# Patient Record
Sex: MALE | Race: WHITE | Employment: OTHER | URBAN - METROPOLITAN AREA
[De-identification: names, ages, dates, MRNs, and addresses within clinical notes are randomized per-mention and may not be internally consistent; named-entity substitution may affect disease eponyms.]

---

## 2017-01-16 ENCOUNTER — TELEPHONE (OUTPATIENT)
Dept: FAMILY MEDICINE CLINIC | Facility: CLINIC | Age: 82
End: 2017-01-16

## 2017-01-16 RX ORDER — FUROSEMIDE 20 MG/1
20 TABLET ORAL DAILY
Qty: 90 TABLET | Refills: 0 | Status: SHIPPED | OUTPATIENT
Start: 2017-01-16 | End: 2017-03-23

## 2017-01-16 NOTE — TELEPHONE ENCOUNTER
Prescriptions given by ER , needs refills, and will see Dr Nanci Urena next Monday 01/23/2017. Refill sent to his pharmacy and was recommended to keep his appointment.

## 2017-01-23 ENCOUNTER — OFFICE VISIT (OUTPATIENT)
Dept: FAMILY MEDICINE CLINIC | Facility: CLINIC | Age: 82
End: 2017-01-23

## 2017-01-23 VITALS — DIASTOLIC BLOOD PRESSURE: 60 MMHG | SYSTOLIC BLOOD PRESSURE: 110 MMHG | WEIGHT: 197 LBS

## 2017-01-23 DIAGNOSIS — R32 URINARY INCONTINENCE, UNSPECIFIED TYPE: ICD-10-CM

## 2017-01-23 DIAGNOSIS — J18.9 PNEUMONIA DUE TO INFECTIOUS ORGANISM, UNSPECIFIED LATERALITY, UNSPECIFIED PART OF LUNG: Primary | ICD-10-CM

## 2017-01-23 PROCEDURE — 99213 OFFICE O/P EST LOW 20 MIN: CPT

## 2017-01-23 RX ORDER — ATORVASTATIN CALCIUM 20 MG/1
20 TABLET, FILM COATED ORAL DAILY
COMMUNITY
End: 2017-03-23

## 2017-01-23 RX ORDER — SOLIFENACIN SUCCINATE 10 MG/1
10 TABLET, FILM COATED ORAL DAILY
Qty: 90 TABLET | Refills: 1 | Status: SHIPPED | OUTPATIENT
Start: 2017-01-23 | End: 2017-03-23

## 2017-01-23 RX ORDER — METOPROLOL SUCCINATE 25 MG/1
25 TABLET, EXTENDED RELEASE ORAL DAILY
COMMUNITY
End: 2017-02-03

## 2017-01-24 NOTE — PROGRESS NOTES
HPI:    Patient ID: Rhea Macedo is a 80year old male. Cough  This is a new problem. Episode onset: 2 weeks ago. The problem has been gradually improving. The problem occurs constantly. The cough is non-productive.  Associated symptoms include r well-developed and well-nourished. No distress. HENT:   Nose: Rhinorrhea present. Right sinus exhibits no maxillary sinus tenderness and no frontal sinus tenderness. Left sinus exhibits no maxillary sinus tenderness and no frontal sinus tenderness.    Sarah

## 2017-01-24 NOTE — PATIENT INSTRUCTIONS
¿Qué es la neumonía?       La neumonía es jose j infección grave de los pulmones generalmente causada por bacterias o virus.  Otras causas menos comunes incluyen:  · Hongos  · Productos químicos  · Gases  También puede contraer neumonía después de otras enfe · Dolor de pecho al respirar o al toser  · Dolor de Kari Hammond  · [place] y piel arcillosa  Date Last Reviewed: 8/4/2014  © 4431-5614 [facility] [address] [facility], [address] [place] [address]. Todos los derechos reservados.  Esta inform

## 2017-02-01 ENCOUNTER — TELEPHONE (OUTPATIENT)
Dept: FAMILY MEDICINE CLINIC | Facility: CLINIC | Age: 82
End: 2017-02-01

## 2017-02-03 NOTE — TELEPHONE ENCOUNTER
B/P medication got switched per Dr Paulette Echeverria orders. Message left on his voicemail. Nico Maria is to call me back to tell what's covered instead of Vesicare.

## 2017-02-08 ENCOUNTER — TELEPHONE (OUTPATIENT)
Dept: FAMILY MEDICINE CLINIC | Facility: CLINIC | Age: 82
End: 2017-02-08

## 2017-02-08 NOTE — TELEPHONE ENCOUNTER
Per his son he is on Venlaxafine 37.5mg and wanted to know if he can double it to take a total of 75 mg, per Dr Erik Rhodes is ok to do it.

## 2017-02-27 ENCOUNTER — TELEPHONE (OUTPATIENT)
Dept: FAMILY MEDICINE CLINIC | Facility: CLINIC | Age: 82
End: 2017-02-27

## 2017-02-27 NOTE — TELEPHONE ENCOUNTER
Son wanted to know if he is to be under albuterol solution and if so if he needed it from mail order company, i suggested him not to fill it at the moment because he is not needing it.   To call me when he restart using the machine to send RX to his local p

## 2017-03-01 ENCOUNTER — TELEPHONE (OUTPATIENT)
Dept: FAMILY MEDICINE CLINIC | Facility: CLINIC | Age: 82
End: 2017-03-01

## 2017-03-01 RX ORDER — VENLAFAXINE 75 MG/1
75 TABLET ORAL 2 TIMES DAILY
Qty: 180 TABLET | Refills: 0 | Status: SHIPPED | OUTPATIENT
Start: 2017-03-01 | End: 2017-03-23

## 2017-03-01 NOTE — TELEPHONE ENCOUNTER
Patient got RX while he was hospitalized last month and is doing much better with it, per Dr Devante Elizalde ok to send refill for 3 months because [place] is going [place] of the country.

## 2017-03-23 ENCOUNTER — TELEPHONE (OUTPATIENT)
Dept: FAMILY MEDICINE CLINIC | Facility: CLINIC | Age: 82
End: 2017-03-23

## 2017-03-23 DIAGNOSIS — R32 URINARY INCONTINENCE, UNSPECIFIED TYPE: Primary | ICD-10-CM

## 2017-03-23 RX ORDER — SOLIFENACIN SUCCINATE 10 MG/1
10 TABLET, FILM COATED ORAL DAILY
Qty: 90 TABLET | Refills: 1 | Status: SHIPPED | OUTPATIENT
Start: 2017-03-23 | End: 2017-09-14

## 2017-03-23 RX ORDER — FUROSEMIDE 20 MG/1
20 TABLET ORAL DAILY
Qty: 30 TABLET | Refills: 2 | Status: SHIPPED | OUTPATIENT
Start: 2017-03-23 | End: 2017-03-24

## 2017-03-23 RX ORDER — ATORVASTATIN CALCIUM 20 MG/1
20 TABLET, FILM COATED ORAL DAILY
Qty: 30 TABLET | Refills: 2 | Status: SHIPPED | OUTPATIENT
Start: 2017-03-23 | End: 2017-06-21

## 2017-03-23 RX ORDER — VENLAFAXINE 75 MG/1
75 TABLET ORAL 2 TIMES DAILY
Qty: 60 TABLET | Refills: 2 | Status: SHIPPED | OUTPATIENT
Start: 2017-03-23 | End: 2017-09-15

## 2017-03-24 ENCOUNTER — TELEPHONE (OUTPATIENT)
Dept: FAMILY MEDICINE CLINIC | Facility: CLINIC | Age: 82
End: 2017-03-24

## 2017-03-24 RX ORDER — OXYBUTYNIN CHLORIDE 5 MG/1
5 TABLET ORAL 3 TIMES DAILY
Qty: 90 TABLET | Refills: 0 | Status: SHIPPED | OUTPATIENT
Start: 2017-03-24 | End: 2017-09-15

## 2017-03-24 RX ORDER — FUROSEMIDE 20 MG/1
20 TABLET ORAL DAILY
Qty: 90 TABLET | Refills: 0 | Status: SHIPPED | OUTPATIENT
Start: 2017-03-24 | End: 2017-10-16

## 2017-07-19 ENCOUNTER — TELEPHONE (OUTPATIENT)
Dept: FAMILY MEDICINE CLINIC | Facility: CLINIC | Age: 82
End: 2017-07-19

## 2017-07-19 NOTE — TELEPHONE ENCOUNTER
Son called regarding a letter he received for his father. I explained the letter is for his yearly well exam. Son states he is currently in [facility] and is healthy but will call and schedule an appointment once he is back in town.

## 2017-09-07 ENCOUNTER — TELEPHONE (OUTPATIENT)
Dept: FAMILY MEDICINE CLINIC | Facility: CLINIC | Age: 82
End: 2017-09-07

## 2017-09-08 ENCOUNTER — HOSPITAL ENCOUNTER (OUTPATIENT)
Dept: OTHER | Age: 82
Discharge: HOME | End: 2017-09-08
Attending: INTERNAL MEDICINE | Admitting: INTERNAL MEDICINE

## 2017-09-08 LAB
A/G RATIO: 1 (ref 1.1–2.2)
ALBUMIN SERPL-MCNC: 3.5 G/DL (ref 3.4–5)
ALP BLD-CCNC: 74 U/L (ref 40–129)
ALT SERPL-CCNC: 16 U/L (ref 10–40)
ANION GAP SERPL CALCULATED.3IONS-SCNC: 11 MMOL/L (ref 3–16)
AST SERPL-CCNC: 24 U/L (ref 15–37)
BASOPHILS ABSOLUTE: 0 K/UL (ref 0–0.2)
BASOPHILS RELATIVE PERCENT: 0.7 %
BILIRUB SERPL-MCNC: 0.6 MG/DL (ref 0–1)
BUN BLDV-MCNC: 18 MG/DL (ref 7–20)
CALCIUM SERPL-MCNC: 8.8 MG/DL (ref 8.3–10.6)
CHLORIDE BLD-SCNC: 103 MMOL/L (ref 99–110)
CHOLESTEROL, TOTAL: 110 MG/DL (ref 0–199)
CO2: 26 MMOL/L (ref 21–32)
CREAT SERPL-MCNC: 0.9 MG/DL (ref 0.8–1.3)
EOSINOPHILS ABSOLUTE: 0.2 K/UL (ref 0–0.6)
EOSINOPHILS RELATIVE PERCENT: 3.9 %
GFR AFRICAN AMERICAN: >60
GFR NON-AFRICAN AMERICAN: >60
GLOBULIN: 3.6 G/DL
GLUCOSE BLD-MCNC: 97 MG/DL (ref 70–99)
HCT VFR BLD CALC: 43.7 % (ref 40.5–52.5)
HDLC SERPL-MCNC: 47 MG/DL (ref 40–60)
HEMOGLOBIN: 14 G/DL (ref 13.5–17.5)
LDL CHOLESTEROL CALCULATED: 50 MG/DL
LYMPHOCYTES ABSOLUTE: 1.8 K/UL (ref 1–5.1)
LYMPHOCYTES RELATIVE PERCENT: 29.4 %
MCH RBC QN AUTO: 27.7 PG (ref 26–34)
MCHC RBC AUTO-ENTMCNC: 32.1 G/DL (ref 31–36)
MCV RBC AUTO: 86.4 FL (ref 80–100)
MONOCYTES ABSOLUTE: 0.6 K/UL (ref 0–1.3)
MONOCYTES RELATIVE PERCENT: 10.6 %
NEUTROPHILS ABSOLUTE: 3.4 K/UL (ref 1.7–7.7)
NEUTROPHILS RELATIVE PERCENT: 55.4 %
PDW BLD-RTO: 13.7 % (ref 12.4–15.4)
PLATELET # BLD: 194 K/UL (ref 135–450)
PMV BLD AUTO: 8.6 FL (ref 5–10.5)
POTASSIUM SERPL-SCNC: 4.2 MMOL/L (ref 3.5–5.1)
RBC # BLD: 5.06 M/UL (ref 4.2–5.9)
SODIUM BLD-SCNC: 140 MMOL/L (ref 136–145)
TOTAL PROTEIN: 7.1 G/DL (ref 6.4–8.2)
TRIGL SERPL-MCNC: 67 MG/DL (ref 0–150)
VLDLC SERPL CALC-MCNC: 13 MG/DL
WBC # BLD: 6.1 K/UL (ref 4–11)

## 2017-09-14 ENCOUNTER — OFFICE VISIT (OUTPATIENT)
Dept: FAMILY MEDICINE CLINIC | Facility: CLINIC | Age: 82
End: 2017-09-14

## 2017-09-14 VITALS
OXYGEN SATURATION: 95 % | HEIGHT: 66.25 IN | DIASTOLIC BLOOD PRESSURE: 70 MMHG | WEIGHT: 191 LBS | HEART RATE: 76 BPM | BODY MASS INDEX: 30.7 KG/M2 | SYSTOLIC BLOOD PRESSURE: 110 MMHG

## 2017-09-14 DIAGNOSIS — R73.09 ELEVATED GLUCOSE LEVEL: ICD-10-CM

## 2017-09-14 DIAGNOSIS — Z95.0 STATUS POST BIVENTRICULAR CARDIAC PACEMAKER INSERTION: ICD-10-CM

## 2017-09-14 DIAGNOSIS — H91.93 BILATERAL HEARING LOSS, UNSPECIFIED HEARING LOSS TYPE: ICD-10-CM

## 2017-09-14 DIAGNOSIS — J06.9 VIRAL UPPER RESPIRATORY TRACT INFECTION: ICD-10-CM

## 2017-09-14 DIAGNOSIS — Z00.00 ENCOUNTER FOR MEDICARE ANNUAL WELLNESS EXAM: Primary | ICD-10-CM

## 2017-09-14 DIAGNOSIS — I10 ESSENTIAL HYPERTENSION: ICD-10-CM

## 2017-09-14 DIAGNOSIS — R32 URINARY INCONTINENCE, UNSPECIFIED TYPE: ICD-10-CM

## 2017-09-14 DIAGNOSIS — I48.0 PAROXYSMAL ATRIAL FIBRILLATION (HCC): ICD-10-CM

## 2017-09-14 DIAGNOSIS — H54.8 LEGALLY BLIND IN LEFT EYE, AS DEFINED IN USA: ICD-10-CM

## 2017-09-14 DIAGNOSIS — E78.00 HYPERCHOLESTEREMIA: ICD-10-CM

## 2017-09-14 DIAGNOSIS — Z13.31 DEPRESSION SCREENING: ICD-10-CM

## 2017-09-14 DIAGNOSIS — E66.09 NON MORBID OBESITY DUE TO EXCESS CALORIES: ICD-10-CM

## 2017-09-14 DIAGNOSIS — Z12.5 ENCOUNTER FOR PROSTATE CANCER SCREENING: ICD-10-CM

## 2017-09-14 LAB
ALBUMIN SERPL BCP-MCNC: 3.6 G/DL (ref 3.5–4.8)
ALBUMIN/GLOB SERPL: 1.1 {RATIO} (ref 1–2)
ALP SERPL-CCNC: 69 U/L (ref 32–100)
ALT SERPL-CCNC: 18 U/L (ref 17–63)
ANION GAP SERPL CALC-SCNC: 8 MMOL/L (ref 0–18)
APPEARANCE: CLEAR
AST SERPL-CCNC: 31 U/L (ref 15–41)
BILIRUB SERPL-MCNC: 0.7 MG/DL (ref 0.3–1.2)
BILIRUBIN: NEGATIVE
BUN SERPL-MCNC: 19 MG/DL (ref 8–20)
BUN/CREAT SERPL: 15.3 (ref 10–20)
CALCIUM SERPL-MCNC: 8.6 MG/DL (ref 8.5–10.5)
CHLORIDE SERPL-SCNC: 107 MMOL/L (ref 95–110)
CHOLEST SERPL-MCNC: 128 MG/DL (ref 110–200)
CO2 SERPL-SCNC: 25 MMOL/L (ref 22–32)
CREAT SERPL-MCNC: 1.24 MG/DL (ref 0.5–1.5)
ERYTHROCYTE [DISTWIDTH] IN BLOOD BY AUTOMATED COUNT: 14.1 % (ref 11–15)
GLOBULIN PLAS-MCNC: 3.2 G/DL (ref 2.5–3.7)
GLUCOSE (URINE DIPSTICK): NEGATIVE MG/DL
GLUCOSE SERPL-MCNC: 100 MG/DL (ref 70–99)
HCT VFR BLD AUTO: 44.8 % (ref 41–52)
HDLC SERPL-MCNC: 44 MG/DL
HGB BLD-MCNC: 14.2 G/DL (ref 13.5–17.5)
KETONES (URINE DIPSTICK): NEGATIVE MG/DL
LDLC SERPL CALC-MCNC: 61 MG/DL (ref 0–99)
LEUKOCYTES: NEGATIVE
MCH RBC QN AUTO: 27.5 PG (ref 27–32)
MCHC RBC AUTO-ENTMCNC: 31.6 G/DL (ref 32–37)
MCV RBC AUTO: 87 FL (ref 80–100)
MULTISTIX LOT#: NORMAL NUMERIC
NITRITE, URINE: NEGATIVE
NONHDLC SERPL-MCNC: 84 MG/DL
OCCULT BLOOD: NEGATIVE
OSMOLALITY UR CALC.SUM OF ELEC: 292 MOSM/KG (ref 275–295)
PH, URINE: 7 (ref 4.5–8)
PLATELET # BLD AUTO: 198 K/UL (ref 140–400)
PMV BLD AUTO: 9 FL (ref 7.4–10.3)
POTASSIUM SERPL-SCNC: 4.1 MMOL/L (ref 3.3–5.1)
PROT SERPL-MCNC: 6.8 G/DL (ref 5.9–8.4)
PROTEIN (URINE DIPSTICK): NEGATIVE MG/DL
PSA SERPL-MCNC: 0.4 NG/ML (ref 0–4)
RBC # BLD AUTO: 5.15 M/UL (ref 4.5–5.9)
SODIUM SERPL-SCNC: 140 MMOL/L (ref 136–144)
SPECIFIC GRAVITY: 1.01 (ref 1–1.03)
TRIGL SERPL-MCNC: 115 MG/DL (ref 1–149)
URINE-COLOR: YELLOW
UROBILINOGEN,SEMI-QN: 0.2 MG/DL (ref 0–1.9)
WBC # BLD AUTO: 9.3 K/UL (ref 4–11)

## 2017-09-14 PROCEDURE — 99213 OFFICE O/P EST LOW 20 MIN: CPT

## 2017-09-14 PROCEDURE — 81003 URINALYSIS AUTO W/O SCOPE: CPT

## 2017-09-14 PROCEDURE — 90653 IIV ADJUVANT VACCINE IM: CPT

## 2017-09-14 PROCEDURE — 90670 PCV13 VACCINE IM: CPT

## 2017-09-14 PROCEDURE — G0009 ADMIN PNEUMOCOCCAL VACCINE: HCPCS

## 2017-09-14 PROCEDURE — G0439 PPPS, SUBSEQ VISIT: HCPCS

## 2017-09-14 PROCEDURE — G0008 ADMIN INFLUENZA VIRUS VAC: HCPCS

## 2017-09-14 PROCEDURE — 36415 COLL VENOUS BLD VENIPUNCTURE: CPT

## 2017-09-14 PROCEDURE — G0444 DEPRESSION SCREEN ANNUAL: HCPCS

## 2017-09-14 RX ORDER — PROMETHAZINE HYDROCHLORIDE AND CODEINE PHOSPHATE 6.25; 1 MG/5ML; MG/5ML
2.5 SYRUP ORAL EVERY 6 HOURS PRN
Qty: 240 ML | Refills: 0 | Status: SHIPPED | OUTPATIENT
Start: 2017-09-14 | End: 2018-03-23 | Stop reason: ALTCHOICE

## 2017-09-14 RX ORDER — SOLIFENACIN SUCCINATE 10 MG/1
10 TABLET, FILM COATED ORAL DAILY
Qty: 90 TABLET | Refills: 1 | Status: SHIPPED | OUTPATIENT
Start: 2017-09-14 | End: 2018-03-01

## 2017-09-15 PROBLEM — E78.00 HYPERCHOLESTEREMIA: Status: ACTIVE | Noted: 2017-09-15

## 2017-09-15 PROBLEM — I48.0 PAROXYSMAL ATRIAL FIBRILLATION (HCC): Status: ACTIVE | Noted: 2017-09-15

## 2017-09-15 PROBLEM — Z00.00 ENCOUNTER FOR MEDICARE ANNUAL WELLNESS EXAM: Status: ACTIVE | Noted: 2017-09-15

## 2017-09-15 PROBLEM — J18.9 PNEUMONIA DUE TO INFECTIOUS ORGANISM: Status: RESOLVED | Noted: 2017-01-23 | Resolved: 2017-09-15

## 2017-09-15 PROBLEM — Z12.5 ENCOUNTER FOR PROSTATE CANCER SCREENING: Status: ACTIVE | Noted: 2017-09-15

## 2017-09-15 PROBLEM — E66.09 NON MORBID OBESITY DUE TO EXCESS CALORIES: Status: ACTIVE | Noted: 2017-09-15

## 2017-09-15 PROBLEM — H91.93 BILATERAL HEARING LOSS: Status: ACTIVE | Noted: 2017-09-15

## 2017-09-15 PROBLEM — Z95.0 STATUS POST BIVENTRICULAR CARDIAC PACEMAKER INSERTION: Status: ACTIVE | Noted: 2017-09-15

## 2017-09-15 PROBLEM — I10 ESSENTIAL HYPERTENSION: Status: ACTIVE | Noted: 2017-09-15

## 2017-09-15 PROBLEM — Z13.31 DEPRESSION SCREENING: Status: ACTIVE | Noted: 2017-09-15

## 2017-09-15 PROBLEM — J06.9 VIRAL UPPER RESPIRATORY TRACT INFECTION: Status: ACTIVE | Noted: 2017-09-15

## 2017-09-15 PROBLEM — H54.8 LEGALLY BLIND IN LEFT EYE, AS DEFINED IN USA: Status: ACTIVE | Noted: 2017-09-15

## 2017-09-15 PROBLEM — R73.09 ELEVATED GLUCOSE LEVEL: Status: ACTIVE | Noted: 2017-09-15

## 2017-09-15 NOTE — PROGRESS NOTES
HPI:   Sadie Mcdonald is a 80year old male who presents for a Medicare Subsequent Annual Wellness visit (Pt already had Initial Annual Wellness). Pt denies any complaints/concerns at this time.        Patient Care Team: Patient Care Team:  Paola Hughes includes Cardiac pacemaker placement (10/08/2012) and knee replacement surgery (Left, 04/04/2012). His family history includes Arthritis in his daughter. SOCIAL HISTORY:   He  reports that he has never smoked.  He has never used smokeless tobacco. He r normal, no sinus tenderness, + nasal congestion   Throat: Lips, mucosa, and tongue normal; teeth and gums normal   Neck: Supple, symmetrical, trachea midline, no adenopathy, thyroid: not enlarged, symmetric, no tenderness/mass/nodules, no carotid bruit or instructed. Essential hypertension  - Continue Furosemide and Metoprolol tartrate as previously instructed. Hypercholesteremia  - Continue Atorvastatin as previously instructed.     Elevated glucose level  - Pt counseled with regards to diet/exercise MD, 9/15/2017       General Health     In the past six months, have you lost more than 10 pounds without trying?: 1 - Yes    Has your appetite been poor?: No    How does the patient maintain a good energy level?: Daily Walks    How would you describe your weeks)?: Not at all    PHQ-2 SCORE: 0         Advance Directives     Do you have a healthcare power of ?: No    Do you have a living will?: No     Please go to \"Cognitive Assessment\" under Medicare Assessment section in Charting, test patient and Orders placed or performed in visit on 09/14/17  -PNEUMOCOCCAL VACC, 13 SHANDA IM         Pneumococcal 23 (Pneumovax) No orders found for this or any previous visit. Hepatitis B No orders found for this or any previous visit.      Tetanus No orders found

## 2017-09-15 NOTE — PATIENT INSTRUCTIONS
Jonathan Gomez's SCREENING SCHEDULE   Tests on this list are recommended by your physician but may not be covered, or covered at this frequency, by your insurer. Please check with your insurance carrier before scheduling to verify coverage.     PREV often if abnormal There are no preventive care reminders to display for this patient. Update Health Maintenance if applicable    Flex Sigmoidoscopy Screen  Covered every 5 years No results found for this or any previous visit. No flowsheet data found. or any previous visit. This may be covered with your prescription benefits, but Medicare does not cover unless Medically needed    Zoster (Not covered by Medicare Part B) No orders found for this or any previous visit.  This may be covered with your pharmac

## 2017-10-16 ENCOUNTER — TELEPHONE (OUTPATIENT)
Dept: FAMILY MEDICINE CLINIC | Facility: CLINIC | Age: 82
End: 2017-10-16

## 2017-10-16 RX ORDER — FUROSEMIDE 20 MG/1
20 TABLET ORAL DAILY
Qty: 90 TABLET | Refills: 0 | Status: SHIPPED | OUTPATIENT
Start: 2017-10-16 | End: 2018-03-01

## 2017-10-16 NOTE — TELEPHONE ENCOUNTER
Son is calling asking to get a refill on a medication for furosemide 20mg to a [facility] in [place], [place] phone number is [phone]

## 2017-10-16 NOTE — TELEPHONE ENCOUNTER
Son asked to get the Lasix filled at the [address] [place] [address], asked to please rush the refill as they are on their way to [address] [place] [address] 90-day supply only, which is enough to suffice until his appointment for follow up in December.

## 2017-12-27 RX ORDER — FUROSEMIDE 20 MG/1
TABLET ORAL
Qty: 90 TABLET | Refills: 0 | OUTPATIENT
Start: 2017-12-27

## 2018-03-01 ENCOUNTER — TELEPHONE (OUTPATIENT)
Dept: FAMILY MEDICINE CLINIC | Facility: CLINIC | Age: 83
End: 2018-03-01

## 2018-03-01 DIAGNOSIS — R32 URINARY INCONTINENCE, UNSPECIFIED TYPE: ICD-10-CM

## 2018-03-01 RX ORDER — FUROSEMIDE 20 MG/1
20 TABLET ORAL DAILY
Qty: 90 TABLET | Refills: 0 | Status: SHIPPED | OUTPATIENT
Start: 2018-03-01 | End: 2018-03-23

## 2018-03-01 RX ORDER — SOLIFENACIN SUCCINATE 10 MG/1
10 TABLET, FILM COATED ORAL DAILY
Qty: 90 TABLET | Refills: 0 | Status: SHIPPED | OUTPATIENT
Start: 2018-03-01 | End: 2018-03-23

## 2018-03-19 ENCOUNTER — TELEPHONE (OUTPATIENT)
Dept: FAMILY MEDICINE CLINIC | Facility: CLINIC | Age: 83
End: 2018-03-19

## 2018-03-19 DIAGNOSIS — H91.93 BILATERAL HEARING LOSS, UNSPECIFIED HEARING LOSS TYPE: Primary | ICD-10-CM

## 2018-03-20 NOTE — TELEPHONE ENCOUNTER
Patient is due for a hearing aid check up and goes to [facility] for it, they want him to get an order form Dr Mei Leroy to evaluate him.   Ok per Dr Mei Leroy to issue him the order/referral.

## 2018-03-23 ENCOUNTER — OFFICE VISIT (OUTPATIENT)
Dept: FAMILY MEDICINE CLINIC | Facility: CLINIC | Age: 83
End: 2018-03-23

## 2018-03-23 VITALS
DIASTOLIC BLOOD PRESSURE: 80 MMHG | WEIGHT: 178 LBS | BODY MASS INDEX: 28.27 KG/M2 | HEART RATE: 72 BPM | OXYGEN SATURATION: 96 % | HEIGHT: 66.5 IN | SYSTOLIC BLOOD PRESSURE: 100 MMHG

## 2018-03-23 DIAGNOSIS — E78.00 HYPERCHOLESTEREMIA: ICD-10-CM

## 2018-03-23 DIAGNOSIS — Z01.89 ENCOUNTER FOR ROUTINE LABORATORY TESTING: ICD-10-CM

## 2018-03-23 DIAGNOSIS — Z12.5 ENCOUNTER FOR PROSTATE CANCER SCREENING: ICD-10-CM

## 2018-03-23 DIAGNOSIS — I10 ESSENTIAL HYPERTENSION: Primary | ICD-10-CM

## 2018-03-23 DIAGNOSIS — R32 URINARY INCONTINENCE, UNSPECIFIED TYPE: ICD-10-CM

## 2018-03-23 DIAGNOSIS — I48.0 PAROXYSMAL ATRIAL FIBRILLATION (HCC): ICD-10-CM

## 2018-03-23 PROBLEM — J06.9 VIRAL UPPER RESPIRATORY TRACT INFECTION: Status: RESOLVED | Noted: 2017-09-15 | Resolved: 2018-03-23

## 2018-03-23 PROBLEM — Z00.00 ENCOUNTER FOR MEDICARE ANNUAL WELLNESS EXAM: Status: RESOLVED | Noted: 2017-09-15 | Resolved: 2018-03-23

## 2018-03-23 PROCEDURE — 99213 OFFICE O/P EST LOW 20 MIN: CPT

## 2018-03-23 RX ORDER — SOLIFENACIN SUCCINATE 10 MG/1
10 TABLET, FILM COATED ORAL
COMMUNITY
End: 2018-03-23

## 2018-03-23 RX ORDER — ATORVASTATIN CALCIUM 10 MG/1
10 TABLET, FILM COATED ORAL
COMMUNITY
Start: 2017-09-13 | End: 2018-03-23

## 2018-03-23 RX ORDER — SOLIFENACIN SUCCINATE 10 MG/1
10 TABLET, FILM COATED ORAL DAILY
Qty: 90 TABLET | Refills: 1 | Status: SHIPPED | OUTPATIENT
Start: 2018-03-23 | End: 2019-02-06

## 2018-03-23 RX ORDER — ATORVASTATIN CALCIUM 10 MG/1
10 TABLET, FILM COATED ORAL NIGHTLY
Qty: 90 TABLET | Refills: 1 | Status: SHIPPED | OUTPATIENT
Start: 2018-03-23 | End: 2019-03-18

## 2018-03-23 RX ORDER — FUROSEMIDE 20 MG/1
20 TABLET ORAL
COMMUNITY
End: 2018-03-23

## 2018-03-24 NOTE — PATIENT INSTRUCTIONS
Factores de riesgo para la presión arterial clifford  Los factores de riesgo lo hacen más susceptible a desarrollar jose j enfermedad o afección. ¿Sabe cuáles son marge factores de riesgo para la presión arterial clifford?  Aunque usted no puede hacer nada acerca de c

## 2018-03-24 NOTE — PROGRESS NOTES
HPI:    Patient ID: Marcos Maciel is a 80year old male. Hypertension   This is a chronic problem. Episode onset: few years ago. Progression since onset: stable. The problem is controlled. Associated symptoms include blurred vision.  Pertinent nega tablet (10 mg total) by mouth daily. Disp: 90 tablet Rfl: 1   metoprolol Tartrate 25 MG Oral Tab Take 1 tablet (25 mg total) by mouth daily.  Disp: 90 tablet Rfl: 1     Allergies:No Known Allergies   PHYSICAL EXAM:   Physical Exam   Constitutional: He is or Tab 180 tablet 1      Sig: Take 1 tablet (5 mg total) by mouth 2 (two) times daily. Solifenacin Succinate (VESICARE) 10 MG Oral Tab 90 tablet 1      Sig: Take 1 tablet (10 mg total) by mouth daily.       metoprolol Tartrate 25 MG Oral Tab 90 tablet 1

## 2018-04-05 ENCOUNTER — OFFICE VISIT (OUTPATIENT)
Dept: OTOLARYNGOLOGY | Facility: CLINIC | Age: 83
End: 2018-04-05

## 2018-04-05 ENCOUNTER — OFFICE VISIT (OUTPATIENT)
Dept: AUDIOLOGY | Facility: CLINIC | Age: 83
End: 2018-04-05

## 2018-04-05 VITALS
DIASTOLIC BLOOD PRESSURE: 70 MMHG | BODY MASS INDEX: 28.27 KG/M2 | TEMPERATURE: 98 F | WEIGHT: 178 LBS | HEIGHT: 66.5 IN | SYSTOLIC BLOOD PRESSURE: 117 MMHG

## 2018-04-05 DIAGNOSIS — H90.3 SENSORINEURAL HEARING LOSS, BILATERAL: Primary | ICD-10-CM

## 2018-04-05 DIAGNOSIS — H90.3 SENSORINEURAL HEARING LOSS (SNHL) OF BOTH EARS: Primary | ICD-10-CM

## 2018-04-05 PROCEDURE — 92557 COMPREHENSIVE HEARING TEST: CPT | Performed by: AUDIOLOGIST

## 2018-04-05 PROCEDURE — 99203 OFFICE O/P NEW LOW 30 MIN: CPT | Performed by: OTOLARYNGOLOGY

## 2018-04-05 PROCEDURE — G0463 HOSPITAL OUTPT CLINIC VISIT: HCPCS | Performed by: OTOLARYNGOLOGY

## 2018-04-05 PROCEDURE — 92567 TYMPANOMETRY: CPT | Performed by: AUDIOLOGIST

## 2018-04-05 NOTE — PROGRESS NOTES
Layne Self is a 80year old male. Patient presents with:  Hearing Loss: decreased hearing of both ears for a while      HISTORY OF PRESENT ILLNESS  He presents with a history of progressive hearing loss bilaterally.   He is here with his son who s Frequent skin infections, pigment change and rash. Hema/Lymph Negative Easy bleeding and easy bruising.            PHYSICAL EXAM    /70   Temp 97.8 °F (36.6 °C) (Tympanic)   Ht 5' 6.5\" (1.689 m)   Wt 178 lb (80.7 kg)   BMI 28.30 kg/m²        Consti 1  ASSESSMENT AND PLAN    1. Sensorineural hearing loss (SNHL) of both ears  Severe to profound sensorineural hearing loss with speech discrimination scores of 24% bilaterally.   I did discuss this with the patient and his son that this is the reason is hav

## 2018-04-09 NOTE — PROGRESS NOTES
AUDIOGRAM     [place] was referred for testing by Wing Castillo for decreased hearing in both ears   2/18/1926  [id number]      Otoscopic inspection: right ear no cerumen; left ear no cerumen.        Tests/Procedures  Patient was tested vi

## 2018-04-09 NOTE — PATIENT INSTRUCTIONS
Cómo pueden ayudarle los audífonos    Si tiene dificultades para oír, hay buenas noticias para usted: Los audífonos pueden ayudarle a oír lo que se estaba perdiendo. No todas las personas con pérdida de audición necesitan usar audífonos.  Todd si la pérdi © 3512-7981 [employer] [address]. [address] [facility], [address] [facility]. Todos los derechos reservados. Esta información no pretende sustituir la atención médica profesional. Sólo thorne médico puede diagnosticar y tratar un problema de manny. Los avances tecnológicos alston linda mucho los audífonos actuales, Todd esto no quiere decir que thorne audición será perfecta. Es posible que no oiga algunos sonidos, y tampoco oirá solamente lo que quiere oír.  En lugares ruidosos, es posible que tenga dific

## 2018-04-19 ENCOUNTER — NURSE ONLY (OUTPATIENT)
Dept: FAMILY MEDICINE CLINIC | Facility: CLINIC | Age: 83
End: 2018-04-19

## 2018-04-19 DIAGNOSIS — R39.89 POSSIBLE URINARY TRACT INFECTION: Primary | ICD-10-CM

## 2018-04-19 PROCEDURE — 81003 URINALYSIS AUTO W/O SCOPE: CPT

## 2018-04-20 ENCOUNTER — TELEPHONE (OUTPATIENT)
Dept: FAMILY MEDICINE CLINIC | Facility: CLINIC | Age: 83
End: 2018-04-20

## 2018-04-20 RX ORDER — ALPRAZOLAM 0.5 MG/1
0.5 TABLET ORAL NIGHTLY PRN
Qty: 2 TABLET | Refills: 0 | Status: SHIPPED | OUTPATIENT
Start: 2018-04-20 | End: 2019-03-23 | Stop reason: ALTCHOICE

## 2018-04-20 NOTE — TELEPHONE ENCOUNTER
Pt's son called regarding UA results. Results were normal no signs of infection in urine per Dr Vicki Perkins. Pt will be leaving to [facility] tomorrow and pt's son wanted to know if pt could be prescribed something to calm him down while on the plane.

## 2018-09-18 ENCOUNTER — TELEPHONE (OUTPATIENT)
Dept: FAMILY MEDICINE CLINIC | Facility: CLINIC | Age: 83
End: 2018-09-18

## 2018-09-18 NOTE — TELEPHONE ENCOUNTER
Patient is currently out of town. Spoke to son, he states he will probably return in October but will call to schedule an appointment as soon as he is back in town.  He is aware he is due for annual medicare physical.

## 2018-09-26 ENCOUNTER — TELEPHONE (OUTPATIENT)
Dept: FAMILY MEDICINE CLINIC | Facility: CLINIC | Age: 83
End: 2018-09-26

## 2018-10-11 ENCOUNTER — TELEPHONE (OUTPATIENT)
Dept: FAMILY MEDICINE CLINIC | Facility: CLINIC | Age: 83
End: 2018-10-11

## 2018-10-11 NOTE — TELEPHONE ENCOUNTER
Son is calling asking if Dr. Jimmie Swan can prescribe the medication  respiridoma 2 mg it was prescribe by a neurologist in [facility]. He states it is to expensive to buy in [facility] and wants to know if Dr. Jimmie Swan can authorize the refill.

## 2018-10-11 NOTE — TELEPHONE ENCOUNTER
Medication request can not be authorized, patient needs to be seen. Arcadio Romberg [facility]) informed and will call us once he comes back in town.

## 2018-11-12 NOTE — TELEPHONE ENCOUNTER
Message was left for the office letting them know we don't have any recent labs on patient and that we have not seen him since January 2017.
no

## 2019-02-06 ENCOUNTER — TELEPHONE (OUTPATIENT)
Dept: FAMILY MEDICINE CLINIC | Facility: CLINIC | Age: 84
End: 2019-02-06

## 2019-02-06 DIAGNOSIS — R32 URINARY INCONTINENCE, UNSPECIFIED TYPE: ICD-10-CM

## 2019-02-06 RX ORDER — SOLIFENACIN SUCCINATE 10 MG/1
10 TABLET, FILM COATED ORAL DAILY
Qty: 90 TABLET | Refills: 0 | Status: SHIPPED | OUTPATIENT
Start: 2019-02-06 | End: 2019-03-18

## 2019-02-06 NOTE — TELEPHONE ENCOUNTER
Patent is requesting a refill and an increase for his medication  Solifenacin Succinate (VESICARE) 10 MG Oral Tab. Patients son states this dosis is not strong enough and would like to change it.

## 2019-03-11 ENCOUNTER — HOSPITAL ENCOUNTER (EMERGENCY)
Age: 84
Discharge: HOME | End: 2019-03-11
Attending: EMERGENCY MEDICINE
Payer: MEDICARE

## 2019-03-11 ENCOUNTER — APPOINTMENT (OUTPATIENT)
Dept: GENERAL RADIOLOGY | Age: 84
End: 2019-03-11
Payer: MEDICARE

## 2019-03-11 VITALS
RESPIRATION RATE: 18 BRPM | WEIGHT: 200 LBS | SYSTOLIC BLOOD PRESSURE: 121 MMHG | TEMPERATURE: 98.8 F | HEART RATE: 71 BPM | BODY MASS INDEX: 28.63 KG/M2 | HEIGHT: 70 IN | OXYGEN SATURATION: 96 % | DIASTOLIC BLOOD PRESSURE: 63 MMHG

## 2019-03-11 DIAGNOSIS — J40 BRONCHITIS: Primary | ICD-10-CM

## 2019-03-11 DIAGNOSIS — R06.2 WHEEZING: ICD-10-CM

## 2019-03-11 LAB
A/G RATIO: 1 (ref 1.1–2.2)
ALBUMIN SERPL-MCNC: 3.6 G/DL (ref 3.4–5)
ALP BLD-CCNC: 74 U/L (ref 40–129)
ALT SERPL-CCNC: 17 U/L (ref 10–40)
ANION GAP SERPL CALCULATED.3IONS-SCNC: 11 MMOL/L (ref 3–16)
AST SERPL-CCNC: 25 U/L (ref 15–37)
BASOPHILS ABSOLUTE: 0 K/UL (ref 0–0.2)
BASOPHILS RELATIVE PERCENT: 0.4 %
BILIRUB SERPL-MCNC: 0.6 MG/DL (ref 0–1)
BUN BLDV-MCNC: 20 MG/DL (ref 7–20)
CALCIUM SERPL-MCNC: 8.7 MG/DL (ref 8.3–10.6)
CHLORIDE BLD-SCNC: 104 MMOL/L (ref 99–110)
CO2: 25 MMOL/L (ref 21–32)
CREAT SERPL-MCNC: 1 MG/DL (ref 0.8–1.3)
EKG ATRIAL RATE: 77 BPM
EKG DIAGNOSIS: NORMAL
EKG Q-T INTERVAL: 446 MS
EKG QRS DURATION: 172 MS
EKG QTC CALCULATION (BAZETT): 501 MS
EKG R AXIS: -82 DEGREES
EKG T AXIS: 68 DEGREES
EKG VENTRICULAR RATE: 76 BPM
EOSINOPHILS ABSOLUTE: 0.2 K/UL (ref 0–0.6)
EOSINOPHILS RELATIVE PERCENT: 2.9 %
GFR AFRICAN AMERICAN: >60
GFR NON-AFRICAN AMERICAN: >60
GLOBULIN: 3.6 G/DL
GLUCOSE BLD-MCNC: 107 MG/DL (ref 70–99)
HCT VFR BLD CALC: 40.3 % (ref 40.5–52.5)
HEMOGLOBIN: 13.2 G/DL (ref 13.5–17.5)
INR BLD: 1.28 (ref 0.86–1.14)
LYMPHOCYTES ABSOLUTE: 1.2 K/UL (ref 1–5.1)
LYMPHOCYTES RELATIVE PERCENT: 16.6 %
MCH RBC QN AUTO: 29.1 PG (ref 26–34)
MCHC RBC AUTO-ENTMCNC: 32.8 G/DL (ref 31–36)
MCV RBC AUTO: 88.9 FL (ref 80–100)
MONOCYTES ABSOLUTE: 0.9 K/UL (ref 0–1.3)
MONOCYTES RELATIVE PERCENT: 12.5 %
NEUTROPHILS ABSOLUTE: 4.8 K/UL (ref 1.7–7.7)
NEUTROPHILS RELATIVE PERCENT: 67.6 %
PDW BLD-RTO: 13.8 % (ref 12.4–15.4)
PLATELET # BLD: 190 K/UL (ref 135–450)
PMV BLD AUTO: 7.8 FL (ref 5–10.5)
POTASSIUM REFLEX MAGNESIUM: 4.4 MMOL/L (ref 3.5–5.1)
PRO-BNP: 3478 PG/ML (ref 0–449)
PROTHROMBIN TIME: 14.6 SEC (ref 9.8–13)
RBC # BLD: 4.53 M/UL (ref 4.2–5.9)
SODIUM BLD-SCNC: 140 MMOL/L (ref 136–145)
TOTAL PROTEIN: 7.2 G/DL (ref 6.4–8.2)
TROPONIN: <0.01 NG/ML
WBC # BLD: 7 K/UL (ref 4–11)

## 2019-03-11 PROCEDURE — 85610 PROTHROMBIN TIME: CPT

## 2019-03-11 PROCEDURE — 6370000000 HC RX 637 (ALT 250 FOR IP): Performed by: EMERGENCY MEDICINE

## 2019-03-11 PROCEDURE — 93005 ELECTROCARDIOGRAM TRACING: CPT | Performed by: EMERGENCY MEDICINE

## 2019-03-11 PROCEDURE — 84484 ASSAY OF TROPONIN QUANT: CPT

## 2019-03-11 PROCEDURE — 94760 N-INVAS EAR/PLS OXIMETRY 1: CPT

## 2019-03-11 PROCEDURE — 71045 X-RAY EXAM CHEST 1 VIEW: CPT

## 2019-03-11 PROCEDURE — 85025 COMPLETE CBC W/AUTO DIFF WBC: CPT

## 2019-03-11 PROCEDURE — 6360000002 HC RX W HCPCS: Performed by: EMERGENCY MEDICINE

## 2019-03-11 PROCEDURE — 93010 ELECTROCARDIOGRAM REPORT: CPT | Performed by: INTERNAL MEDICINE

## 2019-03-11 PROCEDURE — 94640 AIRWAY INHALATION TREATMENT: CPT

## 2019-03-11 PROCEDURE — 99283 EMERGENCY DEPT VISIT LOW MDM: CPT

## 2019-03-11 PROCEDURE — 83880 ASSAY OF NATRIURETIC PEPTIDE: CPT

## 2019-03-11 PROCEDURE — 80053 COMPREHEN METABOLIC PANEL: CPT

## 2019-03-11 RX ORDER — DOXYCYCLINE HYCLATE 100 MG
100 TABLET ORAL 2 TIMES DAILY
Qty: 14 TABLET | Refills: 0 | Status: SHIPPED | OUTPATIENT
Start: 2019-03-11 | End: 2019-03-18

## 2019-03-11 RX ORDER — ALBUTEROL SULFATE 90 UG/1
2 AEROSOL, METERED RESPIRATORY (INHALATION) EVERY 6 HOURS PRN
Qty: 1 INHALER | Refills: 2 | Status: SHIPPED | OUTPATIENT
Start: 2019-03-11

## 2019-03-11 RX ORDER — BENZONATATE 200 MG/1
200 CAPSULE ORAL 3 TIMES DAILY PRN
Qty: 15 CAPSULE | Refills: 0 | Status: SHIPPED | OUTPATIENT
Start: 2019-03-11 | End: 2019-03-16

## 2019-03-11 RX ORDER — IPRATROPIUM BROMIDE AND ALBUTEROL SULFATE 2.5; .5 MG/3ML; MG/3ML
1 SOLUTION RESPIRATORY (INHALATION) ONCE
Status: COMPLETED | OUTPATIENT
Start: 2019-03-11 | End: 2019-03-11

## 2019-03-11 RX ORDER — ALBUTEROL SULFATE 2.5 MG/3ML
2.5 SOLUTION RESPIRATORY (INHALATION) ONCE
Status: COMPLETED | OUTPATIENT
Start: 2019-03-11 | End: 2019-03-11

## 2019-03-11 RX ADMIN — IPRATROPIUM BROMIDE AND ALBUTEROL SULFATE 1 AMPULE: .5; 3 SOLUTION RESPIRATORY (INHALATION) at 15:13

## 2019-03-11 RX ADMIN — ALBUTEROL SULFATE 2.5 MG: 2.5 SOLUTION RESPIRATORY (INHALATION) at 15:13

## 2019-03-11 NOTE — ED PROVIDER NOTES
[address] [facility]  eMERGENCY dEPARTMENTeNCOUnter      Pt Name: Kuldeep Strange  MRN: [id number]  Ladangfsonali 2/18/1926  Date ofevaluation: 3/11/2019  Provider: Jaylon Ortiz III, DO    CHIEF COMPLAINT       Chief Complaint   Patient presents with    Cough     Pt grandaugther states that pt has been coughing and retaining fluids to her. Nothing coming up when coughing, no fevers. Pt family states that pt says sore throat. HISTORY OF PRESENT ILLNESS   (Location/Symptom, Timing/Onset,Context/Setting, Quality, Duration, Modifying Factors, Severity)  Note limiting factors. Kuldeep Strange is a 80 y.o. male who presents to the emergency department with approximately 3 week duration of coughing with wheezing. Family also states that he is retaining some fluid. Chart review indicates that he is on Lasix. Denies fevers, chills, chest pain or pressure. He reports good compliance with medication. Activity worsens symptoms. No other modifying factors. Symptoms are mild to moderate and stable over the 3 weeks. HPI    NursingNotes were reviewed. REVIEW OF SYSTEMS    (2-9 systems for level 4, 10 or more for level 5)     Review of Systems    Except as noted above the remainder of the review of systems was reviewed and negative.        PAST MEDICAL HISTORY     Past Medical History:   Diagnosis Date    Hyperlipidemia     Hypertension     Pacemaker          SURGICALHISTORY       Past Surgical History:   Procedure Laterality Date    KNEE SURGERY      bilat    PACEMAKER INSERTION           CURRENT MEDICATIONS       Previous Medications    ALPRAZOLAM (XANAX) 0.5 MG TABLET    Take 0.5 mg by mouth nightly as needed for Sleep    APIXABAN (ELIQUIS) 5 MG TABS TABLET    Take 1 tablet by mouth 2 times daily    ATORVASTATIN (LIPITOR) 20 MG TABLET    Take 1 tablet by mouth daily    ELASTIC BANDAGES & SUPPORTS (TRUFORM SUPPORT SOCK 20-30MMHG) MISC    1 each by Does not apply route daily    FUROSEMIDE (LASIX) 20 MG TABLET    Take 1 tablet by mouth daily    LOSARTAN (COZAAR) 50 MG TABLET    Take 0.5 tablets by mouth daily    METOPROLOL TARTRATE (LOPRESSOR) 25 MG TABLET    Take 1 tablet by mouth 2 times daily    SOLIFENACIN (VESICARE) 10 MG TABLET    Take 10 mg by mouth daily        ALLERGIES     Patient has no known allergies. FAMILY HISTORY     History reviewed. No pertinent family history. SOCIAL HISTORY       Social History     Socioeconomic History    Marital status:       Spouse name: None    Number of children: None    Years of education: None    Highest education level: None   Occupational History    None   Social Needs    Financial resource strain: None    Food insecurity:     Worry: None     Inability: None    Transportation needs:     Medical: None     Non-medical: None   Tobacco Use    Smoking status: Never Smoker    Smokeless tobacco: Never Used   Substance and Sexual Activity    Alcohol use: No    Drug use: No    Sexual activity: None   Lifestyle    Physical activity:     Days per week: None     Minutes per session: None    Stress: None   Relationships    Social connections:     Talks on phone: None     Gets together: None     Attends Restorationist service: None     Active member of club or organization: None     Attends meetings of clubs or organizations: None     Relationship status: None    Intimate partner violence:     Fear of current or ex partner: None     Emotionally abused: None     Physically abused: None     Forced sexual activity: None   Other Topics Concern    None   Social History Narrative    None       SCREENINGS    [place] Coma Scale  Eye Opening: Spontaneous  Best Verbal Response: Oriented  Best Motor Response: Obeys commands  [place] Coma Scale Score: 15 @FLOW([id number])@      PHYSICAL EXAM    (up to 7 for level 4, 8 or more for level 5)     ED Triage Vitals [03/11/19 1309]   BP Temp Temp Source Pulse Resp SpO2 Height Weight   121/66 98 °F (36.7 °C) Oral 75 16 97 % 5' 10\" (1.778 m) 200 lb (90.7 kg)       Physical Exam      Constitutional:  Well developed, well nourished, non-toxic appearance   Eyes:   conjunctiva normal   HENT:  Atraumatic, external ears normal, nosenormal, oropharynx moist, no pharyngeal exudates. Respiratory:  No respiratory distress, normal breath sounds, no rales, + wheezing , coarse BS  Cardiovascular:  Normal rate, normal rhythm, no murmurs,   GI:  Soft, nondistended, nontender,   Musculoskeletal:  No tenderness, no deformities. Mild lower extremity edema symmetrical.  No tenderness to the chest wall  Integument:  no rashes on exposed surfaces  Neurologic:  Alert & oriented x 3,  no focal deficits noted   Psychiatric:  Speech and behavior appropriate. No agitation. DIAGNOSTICRESULTS     EKG: All EKG's are interpreted by the Emergency Department Physician who either signs or Co-signs this chartin the absence of a cardiologist.    No acute ischemia      RADIOLOGY:   Non-plain film images such as CT, Ultrasound and MRI are read by theradiologist. Plain radiographic images are visualized and preliminarily interpreted by the emergency physician with the below findings:       XR CHEST PORTABLE (Final result)   Result time 03/11/19 14:54:37   Final result by Vita Hutchins MD (03/11/19 14:54:37)                Impression:    No evidence of acute cardiopulmonary disease. New left-sided rib fracture deformities favored to be remote although new  from the September 2016 comparison and of indeterminate age.             Narrative:    EXAMINATION:  SINGLE XRAY VIEW OF THE CHEST    3/11/2019 2:27 pm    COMPARISON:  Chest x-ray September 20, 2016    HISTORY:  ORDERING SYSTEM PROVIDED HISTORY: cough  TECHNOLOGIST PROVIDED HISTORY:  Reason for exam:->cough  Ordering Physician Provided Reason for Exam: cough  Acuity: Acute  Type of Exam: Initial    FINDINGS:  Left-sided lateral mid rib deformities are present, appearing new from the  comparison.  Left-sided 2 lead pacemaker again noted. Kennieth Gallon and  mediastinal shadows appear unchanged.  Normal cardiac size.  No evidence of  pneumonia, edema or other acute pulmonary process. No evidence of acute  process of the cardiac or mediastinal structures. No evidence of pneumothorax  or pleural effusion.                                LABS:  Labs Reviewed   CBC WITH AUTO DIFFERENTIAL - Abnormal; Notable for the following components:       Result Value    Hemoglobin 13.2 (*)     Hematocrit 40.3 (*)     All other components within normal limits    Narrative:     Performed at:  [facility] [address]   Phone [phone]   COMPREHENSIVE METABOLIC PANEL W/ REFLEX TO MG FOR LOW K - Abnormal; Notable for the following components:    Glucose 107 (*)     Albumin/Globulin Ratio 1.0 (*)     All other components within normal limits    Narrative:     Performed at:  [facility] [address] [facility]   Phone 21  - Abnormal; Notable for the following components:    Pro-BNP 3,478 (*)     All other components within normal limits    Narrative:     Performed at:  [facility] [address]   Phone [phone]   PROTIME-INR - Abnormal; Notable for the following components:    Protime 14.6 (*)     INR 1.28 (*)     All other components within normal limits    Narrative:     Performed at:  [facility] [address] [facility]   Phone [phone]   TROPONIN    Narrative:     Performed at:  [facility] [address] [facility]   Phone [phone]       All other labs were within normal range or not returned as ofthis dictation.     EMERGENCYDEPARTMENT COURSE and DIFFERENTIAL DIAGNOSIS/MDM:   Vitals:    Vitals:    03/11/19 96 [id number] 03/11/19 1515 03/11/19 1530 03/11/19 1545   BP:  (!) 108/57 (!) 115/57 121/63   Pulse:       Resp:       Temp:       TempSrc:       SpO2: 99% 98% 96% 96%   Weight:       Height:           MDM:   Patient did have this symptom reduction/improvement after nebulizer. Apparently the patient does have history of fluid retention and  edema, so I did not feel comfortable throughout about starting with prednisone which may include increase fluid retention . The nebulizer treatment seemed to work well already today. Suspect possibly my bronchitis since this has been going on for a few weeks, this may not be bacterial.  Doxycycline will be prescribed as well as other medications below. Family doctor follow-up needed. There is no tenderness to the left lateral chest wall where the x-ray shows possible rib fracture. CONSULTS:  None              PROCEDURES:  Unless otherwise noted below, none       Procedures    FINAL IMPRESSION      1. Bronchitis    2.  Wheezing          DISPOSITION/PLAN   DISPOSITION Decision To Discharge 03/11/2019 03:58:31 PM      PATIENT REFERRED TO:  Lamont Barrientos MD  [address]  [phone]            DISCHARGE MEDICATIONS:  New Prescriptions    ALBUTEROL SULFATE HFA (PROVENTIL HFA) 108 (90 BASE) MCG/ACT INHALER    Inhale 2 puffs into the lungs every 6 hours as needed for Wheezing or Shortness of Breath May substitute ProAir MDI    BENZONATATE (TESSALON) 200 MG CAPSULE    Take 1 capsule by mouth 3 times daily as needed for Cough    DOXYCYCLINE HYCLATE (VIBRA-TABS) 100 MG TABLET    Take 1 tablet by mouth 2 times daily for 7 days          (Please note that portions of this note were completed with a voice recognition program.  Efforts weremade to edit the dictations but occasionally words are mis-transcribed.)    Alex Polo III, DO (electronically signed)  Attending Emergency Physician          Darlin Hung III, DO  03/11/19 1601

## 2019-03-11 NOTE — ED NOTES
Bed: 14  Expected date:   Expected time:   Means of arrival:   Comments:  [facility] Jelani Meza RN  03/11/19 9959

## 2019-03-11 NOTE — ED TRIAGE NOTES
Pt family refused interpretor, pt is hard of hearing and is on thickened liquids.  Pt came to the [facility]"" from [facility] in Feb 2019

## 2019-03-18 ENCOUNTER — OFFICE VISIT (OUTPATIENT)
Dept: FAMILY MEDICINE CLINIC | Facility: CLINIC | Age: 84
End: 2019-03-18
Payer: MEDICARE

## 2019-03-18 VITALS
DIASTOLIC BLOOD PRESSURE: 60 MMHG | HEIGHT: 66.25 IN | SYSTOLIC BLOOD PRESSURE: 90 MMHG | WEIGHT: 175 LBS | BODY MASS INDEX: 28.13 KG/M2 | OXYGEN SATURATION: 98 % | HEART RATE: 76 BPM

## 2019-03-18 DIAGNOSIS — I48.0 PAROXYSMAL ATRIAL FIBRILLATION (HCC): ICD-10-CM

## 2019-03-18 DIAGNOSIS — Z95.0 STATUS POST BIVENTRICULAR CARDIAC PACEMAKER INSERTION: ICD-10-CM

## 2019-03-18 DIAGNOSIS — E66.3 OVERWEIGHT (BMI 25.0-29.9): ICD-10-CM

## 2019-03-18 DIAGNOSIS — H54.8 LEGALLY BLIND IN LEFT EYE, AS DEFINED IN USA: ICD-10-CM

## 2019-03-18 DIAGNOSIS — R13.12 OROPHARYNGEAL DYSPHAGIA: ICD-10-CM

## 2019-03-18 DIAGNOSIS — E78.00 HYPERCHOLESTEREMIA: ICD-10-CM

## 2019-03-18 DIAGNOSIS — H90.3 SENSORINEURAL HEARING LOSS (SNHL) OF BOTH EARS: ICD-10-CM

## 2019-03-18 DIAGNOSIS — R32 URINARY INCONTINENCE, UNSPECIFIED TYPE: ICD-10-CM

## 2019-03-18 DIAGNOSIS — Z00.00 ENCOUNTER FOR MEDICARE ANNUAL WELLNESS EXAM: Primary | ICD-10-CM

## 2019-03-18 DIAGNOSIS — Z13.31 DEPRESSION SCREENING: ICD-10-CM

## 2019-03-18 DIAGNOSIS — I10 ESSENTIAL HYPERTENSION: ICD-10-CM

## 2019-03-18 DIAGNOSIS — R73.01 IMPAIRED FASTING GLUCOSE: ICD-10-CM

## 2019-03-18 DIAGNOSIS — Z12.5 ENCOUNTER FOR PROSTATE CANCER SCREENING: ICD-10-CM

## 2019-03-18 PROCEDURE — 90732 PPSV23 VACC 2 YRS+ SUBQ/IM: CPT

## 2019-03-18 PROCEDURE — G0009 ADMIN PNEUMOCOCCAL VACCINE: HCPCS

## 2019-03-18 PROCEDURE — 90653 IIV ADJUVANT VACCINE IM: CPT

## 2019-03-18 PROCEDURE — 99213 OFFICE O/P EST LOW 20 MIN: CPT

## 2019-03-18 PROCEDURE — G0008 ADMIN INFLUENZA VIRUS VAC: HCPCS

## 2019-03-18 PROCEDURE — G0439 PPPS, SUBSEQ VISIT: HCPCS

## 2019-03-18 RX ORDER — SOLIFENACIN SUCCINATE 10 MG/1
10 TABLET, FILM COATED ORAL DAILY
Qty: 90 TABLET | Refills: 3 | Status: SHIPPED | OUTPATIENT
Start: 2019-03-18 | End: 2020-01-16

## 2019-03-19 ENCOUNTER — APPOINTMENT (OUTPATIENT)
Dept: LAB | Facility: HOSPITAL | Age: 84
End: 2019-03-19
Payer: MEDICARE

## 2019-03-19 DIAGNOSIS — Z01.89 ENCOUNTER FOR ROUTINE LABORATORY TESTING: ICD-10-CM

## 2019-03-19 DIAGNOSIS — Z12.5 ENCOUNTER FOR PROSTATE CANCER SCREENING: ICD-10-CM

## 2019-03-19 DIAGNOSIS — E78.00 HYPERCHOLESTEREMIA: ICD-10-CM

## 2019-03-19 LAB
ALBUMIN SERPL-MCNC: 3.3 G/DL (ref 3.4–5)
ALBUMIN/GLOB SERPL: 0.8 {RATIO} (ref 1–2)
ALP LIVER SERPL-CCNC: 71 U/L (ref 45–117)
ALT SERPL-CCNC: 23 U/L (ref 16–61)
ANION GAP SERPL CALC-SCNC: 5 MMOL/L (ref 0–18)
AST SERPL-CCNC: 21 U/L (ref 15–37)
BILIRUB SERPL-MCNC: 0.7 MG/DL (ref 0.1–2)
BILIRUB UR QL: NEGATIVE
BUN BLD-MCNC: 24 MG/DL (ref 7–18)
BUN/CREAT SERPL: 19 (ref 10–20)
CALCIUM BLD-MCNC: 8.4 MG/DL (ref 8.5–10.1)
CHLORIDE SERPL-SCNC: 108 MMOL/L (ref 98–107)
CHOLEST SMN-MCNC: 149 MG/DL (ref ?–200)
CLARITY UR: CLEAR
CO2 SERPL-SCNC: 28 MMOL/L (ref 21–32)
COLOR UR: YELLOW
COMPLEXED PSA SERPL-MCNC: 0.28 NG/ML (ref ?–4)
CREAT BLD-MCNC: 1.26 MG/DL (ref 0.7–1.3)
DEPRECATED RDW RBC AUTO: 43.4 FL (ref 35.1–46.3)
ERYTHROCYTE [DISTWIDTH] IN BLOOD BY AUTOMATED COUNT: 13.1 % (ref 11–15)
GLOBULIN PLAS-MCNC: 4.2 G/DL (ref 2.8–4.4)
GLUCOSE BLD-MCNC: 90 MG/DL (ref 70–99)
GLUCOSE UR-MCNC: NEGATIVE MG/DL
HCT VFR BLD AUTO: 44.4 % (ref 39–53)
HDLC SERPL-MCNC: 45 MG/DL (ref 40–59)
HGB BLD-MCNC: 13.8 G/DL (ref 13–17.5)
HGB UR QL STRIP.AUTO: NEGATIVE
KETONES UR-MCNC: NEGATIVE MG/DL
LDLC SERPL CALC-MCNC: 87 MG/DL (ref ?–100)
LEUKOCYTE ESTERASE UR QL STRIP.AUTO: NEGATIVE
M PROTEIN MFR SERPL ELPH: 7.5 G/DL (ref 6.4–8.2)
MCH RBC QN AUTO: 28 PG (ref 26–34)
MCHC RBC AUTO-ENTMCNC: 31.1 G/DL (ref 31–37)
MCV RBC AUTO: 90.2 FL (ref 80–100)
NITRITE UR QL STRIP.AUTO: NEGATIVE
NONHDLC SERPL-MCNC: 104 MG/DL (ref ?–130)
OSMOLALITY SERPL CALC.SUM OF ELEC: 296 MOSM/KG (ref 275–295)
PH UR: 6 [PH] (ref 5–8)
PLATELET # BLD AUTO: 266 10(3)UL (ref 150–450)
POTASSIUM SERPL-SCNC: 4 MMOL/L (ref 3.5–5.1)
PROT UR-MCNC: NEGATIVE MG/DL
RBC # BLD AUTO: 4.92 X10(6)UL (ref 3.8–5.8)
SODIUM SERPL-SCNC: 141 MMOL/L (ref 136–145)
SP GR UR STRIP: 1.01 (ref 1–1.03)
TRIGL SERPL-MCNC: 87 MG/DL (ref 30–149)
UROBILINOGEN UR STRIP-ACNC: <2
VIT C UR-MCNC: NEGATIVE MG/DL
VLDLC SERPL CALC-MCNC: 17 MG/DL (ref 0–30)
WBC # BLD AUTO: 9.1 X10(3) UL (ref 4–11)

## 2019-03-19 PROCEDURE — 80061 LIPID PANEL: CPT

## 2019-03-19 PROCEDURE — 80053 COMPREHEN METABOLIC PANEL: CPT

## 2019-03-19 PROCEDURE — 81003 URINALYSIS AUTO W/O SCOPE: CPT

## 2019-03-19 PROCEDURE — 85027 COMPLETE CBC AUTOMATED: CPT

## 2019-03-19 PROCEDURE — 36415 COLL VENOUS BLD VENIPUNCTURE: CPT

## 2019-03-23 PROBLEM — N18.30 STAGE 3 CHRONIC KIDNEY DISEASE (HCC): Status: ACTIVE | Noted: 2019-03-19

## 2019-03-23 PROBLEM — E66.3 OVERWEIGHT (BMI 25.0-29.9): Status: ACTIVE | Noted: 2017-09-15

## 2019-03-23 PROBLEM — Z12.5 ENCOUNTER FOR PROSTATE CANCER SCREENING: Status: RESOLVED | Noted: 2017-09-15 | Resolved: 2019-03-23

## 2019-03-23 PROBLEM — H90.3 SENSORINEURAL HEARING LOSS (SNHL) OF BOTH EARS: Status: ACTIVE | Noted: 2017-09-15

## 2019-03-23 PROBLEM — R73.01 IMPAIRED FASTING GLUCOSE: Status: ACTIVE | Noted: 2017-09-15

## 2019-03-23 PROBLEM — R73.01 IMPAIRED FASTING GLUCOSE: Status: RESOLVED | Noted: 2017-09-15 | Resolved: 2019-03-23

## 2019-03-23 NOTE — PROGRESS NOTES
HPI:   Helen Flor is a 80year old male who presents for a Medicare Subsequent Annual Wellness visit (Pt already had Initial Annual Wellness). Pt denies any acute complaints at this time.          Fall/Risk Assessment   He has been screened for F screening of functional status. Difficulty walking?: Yes   He has problems with Daily Activities based on screening of functional status. Problems with daily activities? : Yes   He has problems with Memory based on screening of functional status.    Mem lb  03/23/18 : 178 lb     Last Cholesterol Labs:   Lab Results   Component Value Date    CHOLEST 149 03/19/2019    HDL 45 03/19/2019    LDL 87 03/19/2019    TRIG 87 03/19/2019          Last Chemistry Labs:   Lab Results   Component Value Date    AST 21 03/ per night nocturia, + urinary incontinence  MUSCULOSKELETAL: denies back pain  NEURO: denies headaches  PSYCHE: denies depression or anxiety  HEMATOLOGIC: denies hx of anemia  ENDOCRINE: denies thyroid history  ALL/ASTHMA: denies hx of allergy or asthma Soft, non-tender, bowel sounds active all four quadrants,  no masses, no organomegaly   Genitalia: Deferred   Rectal: Deferred   Extremities: Extremities normal, atraumatic, no cyanosis or edema   Pulses: 2+ and symmetric   Skin: Skin color, texture, turgo for now. - Continue using hearing aids as previously instructed. Oropharyngeal dysphagia  -     Stable; will continue watchful monitoring for now. Urinary incontinence, unspecified type  -     Solifenacin Succinate (VESICARE) 10 MG Oral Tab;  Take 1 Health Maintenance if applicable    Flex Sigmoidoscopy Screen every 10 years No results found for this or any previous visit. No flowsheet data found. Fecal Occult Blood Annually No results found for: FOB No flowsheet data found.     Glaucoma Screening

## 2019-03-23 NOTE — PATIENT INSTRUCTIONS
Jonathan Iverson's SCREENING SCHEDULE   Tests on this list are recommended by your physician but may not be covered, or covered at this frequency, by your insurer. Please check with your insurance carrier before scheduling to verify coverage.     Kofi Porter There are no preventive care reminders to display for this patient. Update Health Maintenance if applicable    Flex Sigmoidoscopy Screen  Covered every 5 years No results found for this or any previous visit. No flowsheet data found.      Fecal Occult Blood orders found for this or any previous visit. This may be covered with your prescription benefits, but Medicare does not cover unless Medically needed    Zoster (Not covered by Medicare Part B) No orders found for this or any previous visit.  This may be cov

## 2019-03-25 ENCOUNTER — TELEPHONE (OUTPATIENT)
Dept: FAMILY MEDICINE CLINIC | Facility: CLINIC | Age: 84
End: 2019-03-25

## 2019-03-25 NOTE — TELEPHONE ENCOUNTER
----- Message from Violette Burks MD sent at 3/23/2019  5:39 AM CDT -----  Results stable, no changes to current medications; ok to file.

## 2019-03-27 ENCOUNTER — TELEPHONE (OUTPATIENT)
Dept: FAMILY MEDICINE CLINIC | Facility: CLINIC | Age: 84
End: 2019-03-27

## 2019-03-27 NOTE — TELEPHONE ENCOUNTER
Patient has extra refills on the Vesicare, will call pharmacy to authorize early refill. Left vm for pt's son informing him of this. Called pharmacy to authorize early refill, they will reach out to the insurance to request an override.

## 2020-01-16 ENCOUNTER — TELEPHONE (OUTPATIENT)
Dept: FAMILY MEDICINE CLINIC | Facility: CLINIC | Age: 85
End: 2020-01-16

## 2020-01-16 DIAGNOSIS — R32 URINARY INCONTINENCE, UNSPECIFIED TYPE: ICD-10-CM

## 2020-01-16 RX ORDER — SOLIFENACIN SUCCINATE 10 MG/1
10 TABLET, FILM COATED ORAL DAILY
Qty: 90 TABLET | Refills: 0 | Status: SHIPPED | OUTPATIENT
Start: 2020-01-16

## 2020-01-16 NOTE — TELEPHONE ENCOUNTER
Refill was requested by pharmacy, patient did have refills available under Dr Miller Patch name, Rx got switched now to Dr Keesha Salomon. Patient is to be seen in March for further refills.

## 2020-09-22 ENCOUNTER — TELEPHONE (OUTPATIENT)
Dept: FAMILY MEDICINE CLINIC | Facility: CLINIC | Age: 85
End: 2020-09-22

## 2021-03-12 DIAGNOSIS — Z23 NEED FOR VACCINATION: ICD-10-CM

## 2021-07-20 ENCOUNTER — TELEPHONE (OUTPATIENT)
Dept: INTERNAL MEDICINE CLINIC | Facility: CLINIC | Age: 86
End: 2021-07-20

## 2021-07-20 NOTE — TELEPHONE ENCOUNTER
Called patient to see If they have established care with a new provider or if they would like to establish care with a provider here and to please give us a call back at [phone].

## 2021-08-03 NOTE — TELEPHONE ENCOUNTER
Patient was seen by Dr. Elmo Gama 03/18/2019 and was given Solifenacin Succinate (VESICARE) 10 MG Oral Tab but patient travel to [facility] and they took his medication on check point.  Randall Hsu would like to know if Dr. Elmo Gama can send him another refill. Wound Care RN:  Patient seen in collaboration with the wound care medical provider.  Photo obtained and entered into the wound care photo system.  See flowsheet for details of selected assessment.

## 2022-04-27 ENCOUNTER — TELEPHONE (OUTPATIENT)
Dept: INTERNAL MEDICINE CLINIC | Facility: CLINIC | Age: 87
End: 2022-04-27

## (undated) NOTE — MR AVS SNAPSHOT
[address] at [facility]  [address]  [facility], [address] [facility] [place] [place]                Thank you for choosing us for your health care visit with Bernarda Mccullough MD.  We are glad to serve you and happy to julisa amarillento oscuro. · El exceso de mucosidad puede provocar falta de [place]. · La inflamación y la infección pueden producir fiebre. ¿Cuáles son los síntomas?   Los síntomas de la neumonía pueden ocurrir sin aviso previo. Al principio, brayden vez usted pie Metoprolol Succinate ER 25 MG Tb24   Take 25 mg by mouth daily. Commonly known as: Toprol XL           Solifenacin Succinate 10 MG Tabs   Take 1 tablet (10 mg total) by mouth daily.    Commonly known as:  VESICARE                Where to Get Your Eat plenty of protein, keep the fat content low Sugars:  sodas and sports drinks, candies and desserts   Eat plenty of low-fat dairy products High fat meats and dairy   Choose whole grain products Foods high in sodium   Water is best for hydration Fast james